# Patient Record
Sex: FEMALE | Race: WHITE
[De-identification: names, ages, dates, MRNs, and addresses within clinical notes are randomized per-mention and may not be internally consistent; named-entity substitution may affect disease eponyms.]

---

## 2020-05-09 ENCOUNTER — HOSPITAL ENCOUNTER (EMERGENCY)
Dept: HOSPITAL 26 - MED | Age: 55
LOS: 1 days | Discharge: HOME | End: 2020-05-10
Payer: COMMERCIAL

## 2020-05-09 VITALS — BODY MASS INDEX: 32.14 KG/M2 | HEIGHT: 66 IN | WEIGHT: 200 LBS

## 2020-05-09 VITALS — DIASTOLIC BLOOD PRESSURE: 95 MMHG | SYSTOLIC BLOOD PRESSURE: 131 MMHG
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
## 2020-05-09 DIAGNOSIS — S82.92XA: Primary | ICD-10-CM
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
## 2020-05-09 DIAGNOSIS — R03.0: ICD-10-CM
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
## 2020-05-09 PROCEDURE — 27818 TREATMENT OF ANKLE FRACTURE: CPT

## 2020-05-09 PROCEDURE — 96372 THER/PROPH/DIAG INJ SC/IM: CPT

## 2020-05-09 PROCEDURE — 96376 TX/PRO/DX INJ SAME DRUG ADON: CPT

## 2020-05-09 PROCEDURE — 99152 MOD SED SAME PHYS/QHP 5/>YRS: CPT

## 2020-05-09 PROCEDURE — 73610 X-RAY EXAM OF ANKLE: CPT

## 2020-05-09 PROCEDURE — 96374 THER/PROPH/DIAG INJ IV PUSH: CPT

## 2020-05-09 PROCEDURE — 99285 EMERGENCY DEPT VISIT HI MDM: CPT

## 2020-05-10 VITALS — DIASTOLIC BLOOD PRESSURE: 72 MMHG | SYSTOLIC BLOOD PRESSURE: 126 MMHG

## 2020-05-10 NOTE — NUR
-------------------------------------------------------------------------------

            *** Note undone in [facility] - 05/09/20 at 2234 by [facility] ***            

-------------------------------------------------------------------------------

XRAY AT BEDSIDE.
55F BIBA C/O LEFT ANKLE PAIN S/P MECHANICAL SLIP AND FALL. DENIES LOC. 
DEFORMITY TO L ANKLE. AIRSPLINT APPLIED PRE HOSPITAL BY EMS IN THE FIELD. 
CAPILLARY REFILL ON AFFECTED EXTREMITY <2 SECS, CMS INTACT. WARM TO THE TOUCH. 



NKDA 

PMHX -- DENIES 

RX: DENIES
BIBA TO BED 11
CONSCIOUS SEDATION FINISHED AT THIS TIME. 

REFER TO MODERATE SEDATION RECORD FOR VS, MEDS, AND DISCHARGE SCORE.
CONSCIOUS SEDATION STARTED AT THIS TIME. 

REFER TO SEDATION RECORD FOR MEDS GIVEN, VS AND RECOVERY SCORE.
DR GREGORY ADMINISTERED 2MG VERSED FOR CONSCIOUS SEDATION
PALPABLE PEDAL PULSE ON LLE REDUCTION AND SPLINT. 

<2 SECONDS CAPILLARY REFILL. 

CMS INTACT.
PLACED A POSTERIOR SHORT LEG SPLINT ON PT'S LEFT ANKLE. USED 5" FIBERGLASS AND 
3" ACE WRAP TO COVER SPLINT, CHECKED PMSC'S BEFORE AND AFTER PLACEMENT OF 
SPLINT WITHOUT INCIDENT.
PT MEDICATED WITH FENTANYL IM. TOLERATED WELL
PT MEDICATED WITH MORPHINE 2MG FOR PAIN VIA IV. TOLERATED WELL. NADR
PT MEDICATED WITH MORPHINE IV. 

INFUSING NS.
Patient discharged with v/s stable. Written and verbal after care instructions 
given and explained. 

Patient alert, oriented and verbalized understanding of instructions. Wheel 
Chair Assisted with to car. All questions addressed prior to discharge. ID band 
removed. Patient advised to follow up with PMD. Rx of MOTRIN, NORCO, NARCAN  
given. Patient educated on indication of medication including possible reaction 
and side effects. Opportunity to ask questions provided and answered.
SPLINT APPLIED ON LLE. PT TOLERATED WELL. 

PALPABLE PEDAL PULSE ON LLE, CMS INTACT, <2 SEC CAPILLARY REFILL.
SPOKE TO JENNY UGALDE PER PT REQUEST. EXPLAINED PLAN OF CARE. SON VERBALZIED 
UNDERSTANDING AND WILL  PT UPON DISCHARGE.
WAS CALLED TO BEDSIDE FOR CONSCIOUS SEDATION PT TOLERATED WELL WITH NO RESP 
DISTRESS AT THIS TIME WILL CONTINUE TO MONITOR
XRAY AT BEDSIDE DOING PORTABLE XRAY
XRAY AT BEDSIDE FOR PORTABLE XRAY.
11-Jun-2018